# Patient Record
Sex: MALE | Race: BLACK OR AFRICAN AMERICAN | NOT HISPANIC OR LATINO | URBAN - METROPOLITAN AREA
[De-identification: names, ages, dates, MRNs, and addresses within clinical notes are randomized per-mention and may not be internally consistent; named-entity substitution may affect disease eponyms.]

---

## 2022-10-12 PROBLEM — Z00.00 ENCOUNTER FOR PREVENTIVE HEALTH EXAMINATION: Status: ACTIVE | Noted: 2022-10-12

## 2022-10-13 ENCOUNTER — OUTPATIENT (OUTPATIENT)
Dept: OUTPATIENT SERVICES | Facility: HOSPITAL | Age: 62
LOS: 1 days | End: 2022-10-13
Payer: MEDICARE

## 2022-10-13 ENCOUNTER — RESULT REVIEW (OUTPATIENT)
Age: 62
End: 2022-10-13

## 2022-10-13 ENCOUNTER — APPOINTMENT (OUTPATIENT)
Dept: ORTHOPEDIC SURGERY | Facility: CLINIC | Age: 62
End: 2022-10-13

## 2022-10-13 PROCEDURE — 73521 X-RAY EXAM HIPS BI 2 VIEWS: CPT

## 2022-10-13 PROCEDURE — 73521 X-RAY EXAM HIPS BI 2 VIEWS: CPT | Mod: 26

## 2022-10-13 PROCEDURE — 99204 OFFICE O/P NEW MOD 45 MIN: CPT

## 2022-10-13 PROCEDURE — 73562 X-RAY EXAM OF KNEE 3: CPT | Mod: 26,50

## 2022-10-13 PROCEDURE — 73564 X-RAY EXAM KNEE 4 OR MORE: CPT | Mod: RT

## 2022-10-13 PROCEDURE — 73562 X-RAY EXAM OF KNEE 3: CPT

## 2022-10-13 NOTE — REASON FOR VISIT
[Follow-Up Visit] : a follow-up visit for [Hip Pain] : hip pain [Knee Pain] : knee pain [FreeTextEntry2] : Hip and Knee pain.

## 2022-10-13 NOTE — HISTORY OF PRESENT ILLNESS
[de-identified] : First-time visit for this retired 62-year-old gentleman who is here with a complaint of bilateral knee pain and stiffness bilateral hip pain and stiffness.  Patient is status post 4 years right knee replacement by another physician.  Patient had an uncomplicated postop course with the exception of lacking of flexion.  Patient states he has difficulty getting up and out of chairs and getting out of automobile and stairs because of the lack of terminal flexion.  He is here to discuss what he done about his right knee as well as his right hip pain.  Pain in the right hip is fairly chronic is no significant significant decreased range of motion of the right hip he does not recall a specific accident injury or any recent traumatic initiating event he did play basketball as many years as a young male.

## 2022-10-13 NOTE — PHYSICAL EXAM
[de-identified] : Right knee has a well-healed anterior incision range of motion is 3 to 90 degrees.  There is no warmth about the knee.  Knee stable to AP stress varus valgus stress of both 90 degrees and near full extension.\par \par Right hip patient has minimal internal and external rotation at 90 degrees any attempt at rotation causes discomfort. [de-identified] : Hip x-rays were ordered today.  AP and lateral both hips were obtained showing severe osteoarthritis of the right hip with complete cartilage loss and secondary findings of osteophyte and cyst formation.\par \par Knee x-rays were ordered today.  AP standing individual and sunrise views were obtained showing a well-positioned right hip knee implant with no evidence of loosening migration wear or other abnormalities.

## 2022-10-13 NOTE — ASSESSMENT
[FreeTextEntry1] : Patient is status post 4 years right knee replacement with difficulty in flexion beyond 90 degrees.  Patient also has severe right hip osteoarthritis.

## 2022-10-13 NOTE — DISCUSSION/SUMMARY
[Surgical risks reviewed] : Surgical risks reviewed [de-identified] : Had a long discussion with the patient today about the underlying etiology of his right stiffness.  We talked at length about how stiffness can occur despite excellent component position and soft tissue balancing.  I mentioned that this happens to my own patients and that typically at both the first and second postoperative visit range of motion was assessed and fairly frequently required we will recommend knee manipulation.  Patient is now 4 years out of his operation and despite the fact that the best results of the manipulation typically occur within the first 6 weeks to 3 months of the operation there is literature to suggest that remote manipulation from the time of surgery may also yield significant benefits.  We also talked at length about how if he does not gain significant range of motion an open synovectomy and revision may be considered.  Patient will consider these options.\par \par Patient and I also had a long discussion about the underlying etiology of his right hip pain and stiffness.  Patient has diagnosis of severe osteoarthritis of the right hip.  We talked about our options would be best served to consider hip replacement surgery the reasonable risk benefits were discussed.  Patient will also consider this option in the future.\par \par Plan.  Patient will consider the option of right knee manipulation under anesthesia with the understanding that is of the remote nature between the index operation manipulation we cannot guarantee any significant improvement.  We also talked about the potential risks such as femoral fracture.  As far as the hip is concerned patient was advised to consider hip replacement surgery will consider this option follow-up with us as needed for both issues.

## 2023-01-07 ENCOUNTER — LABORATORY RESULT (OUTPATIENT)
Age: 63
End: 2023-01-07

## 2023-01-10 ENCOUNTER — TRANSCRIPTION ENCOUNTER (OUTPATIENT)
Age: 63
End: 2023-01-10

## 2023-01-10 VITALS
WEIGHT: 241.19 LBS | RESPIRATION RATE: 16 BRPM | DIASTOLIC BLOOD PRESSURE: 68 MMHG | OXYGEN SATURATION: 98 % | SYSTOLIC BLOOD PRESSURE: 148 MMHG | HEART RATE: 77 BPM | TEMPERATURE: 98 F | HEIGHT: 76 IN

## 2023-01-10 NOTE — ASU PATIENT PROFILE, ADULT - NSICDXPASTSURGICALHX_GEN_ALL_CORE_FT
PAST SURGICAL HISTORY:  H/O total knee replacement, right 2018    History of neuroma x2 surgeries to left foot

## 2023-01-10 NOTE — ASU PATIENT PROFILE, ADULT - NSICDXPASTMEDICALHX_GEN_ALL_CORE_FT
PAST MEDICAL HISTORY:  2019 novel coronavirus disease (COVID-19)     HTN (hypertension)      PAST MEDICAL HISTORY:  2019 novel coronavirus disease (COVID-19)     Arthritis     HTN (hypertension)     Seasonal allergic rhinitis

## 2023-01-11 ENCOUNTER — APPOINTMENT (OUTPATIENT)
Dept: ORTHOPEDIC SURGERY | Facility: HOSPITAL | Age: 63
End: 2023-01-11
Payer: MEDICARE

## 2023-01-11 ENCOUNTER — TRANSCRIPTION ENCOUNTER (OUTPATIENT)
Age: 63
End: 2023-01-11

## 2023-01-11 ENCOUNTER — OUTPATIENT (OUTPATIENT)
Dept: OUTPATIENT SERVICES | Facility: HOSPITAL | Age: 63
LOS: 1 days | Discharge: HOME | End: 2023-01-11
Payer: COMMERCIAL

## 2023-01-11 VITALS — OXYGEN SATURATION: 98 % | HEART RATE: 65 BPM | RESPIRATION RATE: 18 BRPM

## 2023-01-11 DIAGNOSIS — Z96.651 PRESENCE OF RIGHT ARTIFICIAL KNEE JOINT: Chronic | ICD-10-CM

## 2023-01-11 DIAGNOSIS — Z86.018 PERSONAL HISTORY OF OTHER BENIGN NEOPLASM: Chronic | ICD-10-CM

## 2023-01-11 PROCEDURE — 27570 FIXATION OF KNEE JOINT: CPT | Mod: RT

## 2023-01-11 RX ORDER — ACETAMINOPHEN WITH CODEINE 300MG-30MG
1 TABLET ORAL
Qty: 10 | Refills: 0
Start: 2023-01-11

## 2023-01-11 RX ORDER — OXYCODONE AND ACETAMINOPHEN 5; 325 MG/1; MG/1
2 TABLET ORAL ONCE
Refills: 0 | Status: DISCONTINUED | OUTPATIENT
Start: 2023-01-11 | End: 2023-01-11

## 2023-01-11 RX ORDER — OXYCODONE AND ACETAMINOPHEN 5; 325 MG/1; MG/1
1 TABLET ORAL ONCE
Refills: 0 | Status: DISCONTINUED | OUTPATIENT
Start: 2023-01-11 | End: 2023-01-11

## 2023-01-11 RX ORDER — SODIUM CHLORIDE 9 MG/ML
1000 INJECTION, SOLUTION INTRAVENOUS
Refills: 0 | Status: DISCONTINUED | OUTPATIENT
Start: 2023-01-11 | End: 2023-01-11

## 2023-01-11 RX ORDER — MORPHINE SULFATE 50 MG/1
2 CAPSULE, EXTENDED RELEASE ORAL
Refills: 0 | Status: DISCONTINUED | OUTPATIENT
Start: 2023-01-11 | End: 2023-01-11

## 2023-01-11 RX ORDER — ONDANSETRON 8 MG/1
4 TABLET, FILM COATED ORAL ONCE
Refills: 0 | Status: DISCONTINUED | OUTPATIENT
Start: 2023-01-11 | End: 2023-01-11

## 2023-01-11 NOTE — BRIEF OPERATIVE NOTE - NSICDXBRIEFPREOP_GEN_ALL_CORE_FT
PRE-OP DIAGNOSIS:  Arthrofibrosis of total knee arthroplasty 11-Jan-2023 09:04:45  Joseph Jauregui

## 2023-01-11 NOTE — ASU DISCHARGE PLAN (ADULT/PEDIATRIC) - CARE PROVIDER_API CALL
Sree Navarro)  Orthopaedic Surgery  [address] [facility], 10th [place] [address]  Phone: [phone]  Fax: [phone]  Established Patient  Follow Up Time:

## 2023-01-11 NOTE — BRIEF OPERATIVE NOTE - NSICDXBRIEFPOSTOP_GEN_ALL_CORE_FT
POST-OP DIAGNOSIS:  Arthrofibrosis of total knee arthroplasty 11-Jan-2023 09:04:51  Joseph Jauregui

## 2023-01-11 NOTE — BRIEF OPERATIVE NOTE - NSICDXBRIEFPROCEDURE_GEN_ALL_CORE_FT
PROCEDURES:  Manipulation of right knee 11-Jan-2023 09:04:06  Joseph Jauregui  Injection of right knee 11-Jan-2023 09:04:19  Joseph Jauregui

## 2023-01-12 PROBLEM — I10 ESSENTIAL (PRIMARY) HYPERTENSION: Chronic | Status: ACTIVE | Noted: 2023-01-10

## 2023-01-12 PROBLEM — U07.1 COVID-19: Chronic | Status: ACTIVE | Noted: 2023-01-10

## 2023-01-17 ENCOUNTER — APPOINTMENT (OUTPATIENT)
Dept: ORTHOPEDIC SURGERY | Facility: CLINIC | Age: 63
End: 2023-01-17
Payer: MEDICARE

## 2023-01-17 DIAGNOSIS — M17.11 UNILATERAL PRIMARY OSTEOARTHRITIS, RIGHT KNEE: ICD-10-CM

## 2023-01-17 PROCEDURE — 99214 OFFICE O/P EST MOD 30 MIN: CPT

## 2023-01-17 NOTE — HISTORY OF PRESENT ILLNESS
[de-identified] : Patient returns today he is status postop day 6 right knee manipulation under anesthesia.  Patient states he feels significant improvement is having a much easier time going up and down steps is very pleased with the result.  He continues to do his own exercise and stretching regimen.

## 2023-01-17 NOTE — PHYSICAL EXAM
[de-identified] : Right knee physical exam 0 to 90 degrees.  There is no significant warmth no swelling quad tone is good extensor mechanism is intact.

## 2023-01-17 NOTE — DISCUSSION/SUMMARY
[Surgical risks reviewed] : Surgical risks reviewed [de-identified] : Patient will continue with his exercise regimen on his own.  He will follow-up with me in 2 months time we we will consider repeat manipulation if patient would like to improve the bending further but for now he is quite satisfied with his result.\par \par We also talked at length about how the patient would want to consider hip replacement surgery sometime in February previous visits and radiographs indicate severe osteoarthritis of the right hip.  The reasonable risk and benefits of the procedure were discussed in detail including potential complications.\par \par Surgical risks reviewed. We talked about potential complications of surgery.  We talked about potential complications which may require revision surgery such as component migration wear, loosening, infection, instability and leg length equality.\par We also talked at length about typical convalescence expectations, typical expectations for implant longevity, surgical approach for us which is posterior as well as implant selection criteria.\par \par Plan patient will be scheduled for hip replacement surgery sometime in February.\par \par Today's consultation lasted approximately 35 minutes.

## 2023-01-17 NOTE — REASON FOR VISIT
[Follow-Up Visit] : a follow-up visit for [Artificial Knee Joint] : an artificial knee joint [FreeTextEntry2] : Rt knee manipulation 1/11. Pt states he is feeling good.

## 2023-02-09 PROBLEM — M19.90 UNSPECIFIED OSTEOARTHRITIS, UNSPECIFIED SITE: Chronic | Status: ACTIVE | Noted: 2023-01-11

## 2023-02-09 PROBLEM — J30.2 OTHER SEASONAL ALLERGIC RHINITIS: Chronic | Status: ACTIVE | Noted: 2023-01-11

## 2023-03-04 ENCOUNTER — LABORATORY RESULT (OUTPATIENT)
Age: 63
End: 2023-03-04

## 2023-03-07 ENCOUNTER — TRANSCRIPTION ENCOUNTER (OUTPATIENT)
Age: 63
End: 2023-03-07

## 2023-03-07 VITALS
HEART RATE: 74 BPM | SYSTOLIC BLOOD PRESSURE: 133 MMHG | TEMPERATURE: 98 F | RESPIRATION RATE: 16 BRPM | DIASTOLIC BLOOD PRESSURE: 73 MMHG | WEIGHT: 218.48 LBS | OXYGEN SATURATION: 97 % | HEIGHT: 76 IN

## 2023-03-07 RX ORDER — POVIDONE-IODINE 5 %
1 AEROSOL (ML) TOPICAL ONCE
Refills: 0 | Status: COMPLETED | OUTPATIENT
Start: 2023-03-08 | End: 2023-03-08

## 2023-03-07 NOTE — ASU PATIENT PROFILE, ADULT - FALL HARM RISK - UNIVERSAL INTERVENTIONS
Bed in lowest position, wheels locked, appropriate side rails in place/Call bell, personal items and telephone in reach/Instruct patient to call for assistance before getting out of bed or chair/Non-slip footwear when patient is out of bed/[place] to call system/Physically safe environment - no spills, clutter or unnecessary equipment/Purposeful Proactive Rounding/Room/bathroom lighting operational, light cord in reach

## 2023-03-07 NOTE — H&P ADULT - NSICDXPASTMEDICALHX_GEN_ALL_CORE_FT
PAST MEDICAL HISTORY:  2019 novel coronavirus disease (COVID-19)     Arthritis     HTN (hypertension)     Osteoarthritis of right hip     Seasonal allergic rhinitis      PAST MEDICAL HISTORY:  2019 novel coronavirus disease (COVID-19)     Arthritis     HTN (hypertension)     Neuroma     Osteoarthritis of right hip     Seasonal allergic rhinitis

## 2023-03-07 NOTE — ASU PATIENT PROFILE, ADULT - NSICDXPASTMEDICALHX_GEN_ALL_CORE_FT
PAST MEDICAL HISTORY:  2019 novel coronavirus disease (COVID-19)     Arthritis     HTN (hypertension)     Neuroma     Osteoarthritis of right hip     Seasonal allergic rhinitis

## 2023-03-07 NOTE — H&P ADULT - HISTORY OF PRESENT ILLNESS
62M c/o right hip pain x       Present for elective right total hip replacement  62M c/o right hip pain x 1 year without inciting accident or injury. States pain is worst with walking. Denies numbness/tingling to bilateral lower extremities. Pain persists despite conservative measures. Ambulates without assistance.   Denies history of a blood clot or seizures. Denies CP/SOB/N/V.     Present for elective right total hip replacement

## 2023-03-07 NOTE — H&P ADULT - NSHPPHYSICALEXAM_GEN_ALL_CORE
MSK: + decreased ROM 2/2 pain, right hip      Remainder of exam per medical clearance note Gen: Alert and oriented, NAD  MSK: + decreased ROM 2/2 pain, right hip  No evidence of deformity or open wounds.   DP pulses palpable bilaterally.   SILT to bilateral lower extremities  EHL/FHL/TA/GS 5/5 bilaterally    Remainder of exam per medical clearance note

## 2023-03-07 NOTE — H&P ADULT - NSHPLABSRESULTS_GEN_ALL_CORE
3M DOS  Covid negative 3/4 3M DOS  Covid negative 3/4  Preop CBC, BMP, PT/PTT/INR, UA within normal limits- reviewed by medical clearance.  Preop EKG: NSR, reviewed by medical clearance.

## 2023-03-07 NOTE — H&P ADULT - PROBLEM SELECTOR PLAN 1
Admit to Orthopaedic Service.  Presents today for elective right THR   Pt medically stable and cleared for procedure today by  Admit to Orthopaedic Service.  Presents today for elective right THR   Pt medically stable and cleared for procedure today by Dr. Reese

## 2023-03-08 ENCOUNTER — INPATIENT (INPATIENT)
Facility: HOSPITAL | Age: 63
LOS: 1 days | Discharge: HOME | DRG: 470 | End: 2023-03-10
Attending: SPECIALIST | Admitting: SPECIALIST
Payer: COMMERCIAL

## 2023-03-08 ENCOUNTER — RESULT REVIEW (OUTPATIENT)
Age: 63
End: 2023-03-08

## 2023-03-08 ENCOUNTER — APPOINTMENT (OUTPATIENT)
Dept: ORTHOPEDIC SURGERY | Facility: HOSPITAL | Age: 63
End: 2023-03-08
Payer: MEDICARE

## 2023-03-08 DIAGNOSIS — M16.11 UNILATERAL PRIMARY OSTEOARTHRITIS, RIGHT HIP: ICD-10-CM

## 2023-03-08 DIAGNOSIS — I10 ESSENTIAL (PRIMARY) HYPERTENSION: ICD-10-CM

## 2023-03-08 DIAGNOSIS — Z86.018 PERSONAL HISTORY OF OTHER BENIGN NEOPLASM: Chronic | ICD-10-CM

## 2023-03-08 DIAGNOSIS — Z96.651 PRESENCE OF RIGHT ARTIFICIAL KNEE JOINT: Chronic | ICD-10-CM

## 2023-03-08 PROCEDURE — 72170 X-RAY EXAM OF PELVIS: CPT | Mod: 26

## 2023-03-08 PROCEDURE — 27130 TOTAL HIP ARTHROPLASTY: CPT | Mod: RT

## 2023-03-08 PROCEDURE — 27130 TOTAL HIP ARTHROPLASTY: CPT | Mod: AS,RT

## 2023-03-08 DEVICE — HEAD FEM CEM TAPR PLUS 1.5MM 12/14X36MM: Type: IMPLANTABLE DEVICE | Site: RIGHT | Status: FUNCTIONAL

## 2023-03-08 DEVICE — PIN TROCAR STEIN 1 END 4X22.9MM: Type: IMPLANTABLE DEVICE | Site: RIGHT | Status: FUNCTIONAL

## 2023-03-08 DEVICE — PINNACLE ALTRX 36  X 58: Type: IMPLANTABLE DEVICE | Site: RIGHT | Status: FUNCTIONAL

## 2023-03-08 DEVICE — CUP ACETABULAR 58MM: Type: IMPLANTABLE DEVICE | Site: RIGHT | Status: FUNCTIONAL

## 2023-03-08 DEVICE — IMPLANTABLE DEVICE: Type: IMPLANTABLE DEVICE | Site: RIGHT | Status: FUNCTIONAL

## 2023-03-08 RX ORDER — MAGNESIUM HYDROXIDE 400 MG/1
30 TABLET, CHEWABLE ORAL DAILY
Refills: 0 | Status: DISCONTINUED | OUTPATIENT
Start: 2023-03-08 | End: 2023-03-10

## 2023-03-08 RX ORDER — ACETAMINOPHEN 500 MG
975 TABLET ORAL EVERY 8 HOURS
Refills: 0 | Status: DISCONTINUED | OUTPATIENT
Start: 2023-03-08 | End: 2023-03-10

## 2023-03-08 RX ORDER — CELECOXIB 200 MG/1
400 CAPSULE ORAL ONCE
Refills: 0 | Status: COMPLETED | OUTPATIENT
Start: 2023-03-08 | End: 2023-03-08

## 2023-03-08 RX ORDER — CELECOXIB 200 MG/1
200 CAPSULE ORAL EVERY 12 HOURS
Refills: 0 | Status: DISCONTINUED | OUTPATIENT
Start: 2023-03-09 | End: 2023-03-09

## 2023-03-08 RX ORDER — CELECOXIB 200 MG/1
200 CAPSULE ORAL DAILY
Refills: 0 | Status: DISCONTINUED | OUTPATIENT
Start: 2023-03-08 | End: 2023-03-10

## 2023-03-08 RX ORDER — OXYCODONE HYDROCHLORIDE 5 MG/1
10 TABLET ORAL
Refills: 0 | Status: DISCONTINUED | OUTPATIENT
Start: 2023-03-08 | End: 2023-03-10

## 2023-03-08 RX ORDER — HYDROMORPHONE HYDROCHLORIDE 2 MG/ML
0.5 INJECTION INTRAMUSCULAR; INTRAVENOUS; SUBCUTANEOUS
Refills: 0 | Status: DISCONTINUED | OUTPATIENT
Start: 2023-03-08 | End: 2023-03-10

## 2023-03-08 RX ORDER — OXYCODONE HYDROCHLORIDE 5 MG/1
20 TABLET ORAL ONCE
Refills: 0 | Status: DISCONTINUED | OUTPATIENT
Start: 2023-03-08 | End: 2023-03-08

## 2023-03-08 RX ORDER — SODIUM CHLORIDE 9 MG/ML
1000 INJECTION, SOLUTION INTRAVENOUS
Refills: 0 | Status: DISCONTINUED | OUTPATIENT
Start: 2023-03-08 | End: 2023-03-10

## 2023-03-08 RX ORDER — SENNA PLUS 8.6 MG/1
2 TABLET ORAL AT BEDTIME
Refills: 0 | Status: DISCONTINUED | OUTPATIENT
Start: 2023-03-08 | End: 2023-03-10

## 2023-03-08 RX ORDER — CHLORHEXIDINE GLUCONATE 213 G/1000ML
1 SOLUTION TOPICAL EVERY 12 HOURS
Refills: 0 | Status: DISCONTINUED | OUTPATIENT
Start: 2023-03-08 | End: 2023-03-08

## 2023-03-08 RX ORDER — HYDROMORPHONE HYDROCHLORIDE 2 MG/ML
0.5 INJECTION INTRAMUSCULAR; INTRAVENOUS; SUBCUTANEOUS EVERY 4 HOURS
Refills: 0 | Status: DISCONTINUED | OUTPATIENT
Start: 2023-03-08 | End: 2023-03-10

## 2023-03-08 RX ORDER — ONDANSETRON 8 MG/1
4 TABLET, FILM COATED ORAL EVERY 6 HOURS
Refills: 0 | Status: DISCONTINUED | OUTPATIENT
Start: 2023-03-08 | End: 2023-03-10

## 2023-03-08 RX ORDER — POLYETHYLENE GLYCOL 3350 17 G/17G
17 POWDER, FOR SOLUTION ORAL AT BEDTIME
Refills: 0 | Status: DISCONTINUED | OUTPATIENT
Start: 2023-03-08 | End: 2023-03-10

## 2023-03-08 RX ORDER — OXYCODONE HYDROCHLORIDE 5 MG/1
5 TABLET ORAL
Refills: 0 | Status: DISCONTINUED | OUTPATIENT
Start: 2023-03-08 | End: 2023-03-10

## 2023-03-08 RX ORDER — ASPIRIN/CALCIUM CARB/MAGNESIUM 324 MG
325 TABLET ORAL
Refills: 0 | Status: DISCONTINUED | OUTPATIENT
Start: 2023-03-09 | End: 2023-03-10

## 2023-03-08 RX ORDER — CEFAZOLIN SODIUM 1 G
2000 VIAL (EA) INJECTION EVERY 8 HOURS
Refills: 0 | Status: COMPLETED | OUTPATIENT
Start: 2023-03-08 | End: 2023-03-09

## 2023-03-08 RX ADMIN — Medication 100 MILLIGRAM(S): at 21:32

## 2023-03-08 RX ADMIN — OXYCODONE HYDROCHLORIDE 20 MILLIGRAM(S): 5 TABLET ORAL at 10:23

## 2023-03-08 RX ADMIN — Medication 1 APPLICATION(S): at 08:22

## 2023-03-08 RX ADMIN — CELECOXIB 400 MILLIGRAM(S): 200 CAPSULE ORAL at 10:23

## 2023-03-08 RX ADMIN — CELECOXIB 400 MILLIGRAM(S): 200 CAPSULE ORAL at 10:14

## 2023-03-08 RX ADMIN — Medication 975 MILLIGRAM(S): at 22:32

## 2023-03-08 RX ADMIN — CHLORHEXIDINE GLUCONATE 1 APPLICATION(S): 213 SOLUTION TOPICAL at 07:49

## 2023-03-08 RX ADMIN — Medication 975 MILLIGRAM(S): at 21:32

## 2023-03-08 RX ADMIN — OXYCODONE HYDROCHLORIDE 20 MILLIGRAM(S): 5 TABLET ORAL at 10:14

## 2023-03-08 NOTE — PHYSICAL THERAPY INITIAL EVALUATION ADULT - ADDITIONAL COMMENTS
Patient lives alone, however will be stating with his mother on 5th floor walk up apartment. Does not use any Assistive Devices at baseline. Owns SC, RW and raised toilet seat. Denies recent Hx of falls.

## 2023-03-08 NOTE — PHYSICAL THERAPY INITIAL EVALUATION ADULT - GENERAL OBSERVATIONS, REHAB EVAL
Patient received semi-mattson in bed  in NAD on RA, +SCDs, +PIV, +Abduction pillow. Cleared by JONO Mcgraw. Agreeable to PT.

## 2023-03-08 NOTE — PROGRESS NOTE ADULT - SUBJECTIVE AND OBJECTIVE BOX
Orthopedics Post Op Check    Procedure: right REBKEAH   Surgeon: Dr Navarro     Pt comfortable, without complaints. Pain well controlled in pacu   Denies CP, SOB, N/V, numbness/tingling     Vital Signs Last 24 Hrs  T(C): 36.2 (08 Mar 2023 14:22), Max: 36.7 (08 Mar 2023 10:21)  T(F): 97.1 (08 Mar 2023 14:22), Max: 97.1 (08 Mar 2023 14:22)  HR: 58 (08 Mar 2023 14:43) (54 - 74)  BP: 128/79 (08 Mar 2023 14:38) (114/75 - 133/73)  BP(mean): 99 (08 Mar 2023 14:38) (91 - 99)  RR: 18 (08 Mar 2023 14:43) (10 - 19)  SpO2: 100% (08 Mar 2023 14:38) (96% - 100%)    Parameters below as of 08 Mar 2023 14:43  Patient On (Oxygen Delivery Method): nasal cannula  O2 Flow (L/min): 2    AVSS, NAD  General: Pt Alert and oriented, comfortable  Dressing C/D/I, aquacel, abduction pillow in place   Sensation intact and equal BLE   Motor ehl/ta/gs/fhl 5/5 BLE   Pulses dp palpable BLE, skin warm and well perfused, cap refill brisk     Post op XR: s/p  right REBEKAH hardware in place     A/P: 62yMale POD#0 s/p right REBEKAH doing well in PACU   - Stable  - Pain Control  - DVT ppx: SCDs ASA   - Melissa-op abx: Ancef  - PT, WBS: WBAT   - F/U AM Labs

## 2023-03-08 NOTE — PHYSICAL THERAPY INITIAL EVALUATION ADULT - PERTINENT HX OF CURRENT PROBLEM, REHAB EVAL
62M c/o right hip pain x 1 year without inciting accident or injury. States pain is worst with walking. Denies numbness/tingling to bilateral lower extremities. Pain persists despite conservative measures. Ambulates without assistance.

## 2023-03-09 ENCOUNTER — TRANSCRIPTION ENCOUNTER (OUTPATIENT)
Age: 63
End: 2023-03-09

## 2023-03-09 LAB
ANION GAP SERPL CALC-SCNC: 8 MMOL/L — SIGNIFICANT CHANGE UP (ref 5–17)
BUN SERPL-MCNC: 15 MG/DL — SIGNIFICANT CHANGE UP (ref 7–23)
CALCIUM SERPL-MCNC: 8.6 MG/DL — SIGNIFICANT CHANGE UP (ref 8.4–10.5)
CHLORIDE SERPL-SCNC: 105 MMOL/L — SIGNIFICANT CHANGE UP (ref 96–108)
CO2 SERPL-SCNC: 28 MMOL/L — SIGNIFICANT CHANGE UP (ref 22–31)
CREAT SERPL-MCNC: 1.08 MG/DL — SIGNIFICANT CHANGE UP (ref 0.5–1.3)
EGFR: 79 ML/MIN/1.73M2 — SIGNIFICANT CHANGE UP
GLUCOSE SERPL-MCNC: 118 MG/DL — HIGH (ref 70–99)
HCT VFR BLD CALC: 42.9 % — SIGNIFICANT CHANGE UP (ref 39–50)
HCV AB S/CO SERPL IA: 0.04 S/CO — SIGNIFICANT CHANGE UP
HCV AB SERPL-IMP: SIGNIFICANT CHANGE UP
HGB BLD-MCNC: 13.8 G/DL — SIGNIFICANT CHANGE UP (ref 13–17)
MCHC RBC-ENTMCNC: 28.5 PG — SIGNIFICANT CHANGE UP (ref 27–34)
MCHC RBC-ENTMCNC: 32.2 GM/DL — SIGNIFICANT CHANGE UP (ref 32–36)
MCV RBC AUTO: 88.6 FL — SIGNIFICANT CHANGE UP (ref 80–100)
NRBC # BLD: 0 /100 WBCS — SIGNIFICANT CHANGE UP (ref 0–0)
PLATELET # BLD AUTO: 202 K/UL — SIGNIFICANT CHANGE UP (ref 150–400)
POTASSIUM SERPL-MCNC: 4.1 MMOL/L — SIGNIFICANT CHANGE UP (ref 3.5–5.3)
POTASSIUM SERPL-SCNC: 4.1 MMOL/L — SIGNIFICANT CHANGE UP (ref 3.5–5.3)
RBC # BLD: 4.84 M/UL — SIGNIFICANT CHANGE UP (ref 4.2–5.8)
RBC # FLD: 14 % — SIGNIFICANT CHANGE UP (ref 10.3–14.5)
SODIUM SERPL-SCNC: 141 MMOL/L — SIGNIFICANT CHANGE UP (ref 135–145)
WBC # BLD: 7.32 K/UL — SIGNIFICANT CHANGE UP (ref 3.8–10.5)
WBC # FLD AUTO: 7.32 K/UL — SIGNIFICANT CHANGE UP (ref 3.8–10.5)

## 2023-03-09 RX ORDER — NIFEDIPINE 30 MG
90 TABLET, EXTENDED RELEASE 24 HR ORAL DAILY
Refills: 0 | Status: DISCONTINUED | OUTPATIENT
Start: 2023-03-09 | End: 2023-03-10

## 2023-03-09 RX ADMIN — OXYCODONE HYDROCHLORIDE 5 MILLIGRAM(S): 5 TABLET ORAL at 10:13

## 2023-03-09 RX ADMIN — Medication 975 MILLIGRAM(S): at 14:05

## 2023-03-09 RX ADMIN — CELECOXIB 200 MILLIGRAM(S): 200 CAPSULE ORAL at 11:16

## 2023-03-09 RX ADMIN — Medication 975 MILLIGRAM(S): at 05:28

## 2023-03-09 RX ADMIN — OXYCODONE HYDROCHLORIDE 5 MILLIGRAM(S): 5 TABLET ORAL at 16:47

## 2023-03-09 RX ADMIN — OXYCODONE HYDROCHLORIDE 5 MILLIGRAM(S): 5 TABLET ORAL at 11:13

## 2023-03-09 RX ADMIN — Medication 325 MILLIGRAM(S): at 05:28

## 2023-03-09 RX ADMIN — OXYCODONE HYDROCHLORIDE 5 MILLIGRAM(S): 5 TABLET ORAL at 15:47

## 2023-03-09 RX ADMIN — Medication 100 MILLIGRAM(S): at 05:28

## 2023-03-09 RX ADMIN — Medication 90 MILLIGRAM(S): at 11:16

## 2023-03-09 RX ADMIN — Medication 975 MILLIGRAM(S): at 21:07

## 2023-03-09 RX ADMIN — Medication 325 MILLIGRAM(S): at 18:36

## 2023-03-09 RX ADMIN — Medication 975 MILLIGRAM(S): at 22:07

## 2023-03-09 RX ADMIN — CELECOXIB 200 MILLIGRAM(S): 200 CAPSULE ORAL at 12:16

## 2023-03-09 RX ADMIN — Medication 975 MILLIGRAM(S): at 15:05

## 2023-03-09 RX ADMIN — Medication 975 MILLIGRAM(S): at 06:28

## 2023-03-09 RX ADMIN — Medication 1 TABLET(S): at 11:16

## 2023-03-09 NOTE — PROGRESS NOTE ADULT - SUBJECTIVE AND OBJECTIVE BOX
Ortho Note    Subjective:  Pt comfortable without complaints, pain controlled with current pain medication regimen   Denies CP, SOB, N/V, numbness/tingling   Reviewed plan of care with patient at bedside, reviewed posterior hip precautions       Vital Signs Last 24 Hrs  T(C): 36.7 (03-09-23 @ 08:18), Max: 36.8 (03-09-23 @ 05:30)  T(F): 98.1 (03-09-23 @ 08:18), Max: 98.3 (03-09-23 @ 05:30)  HR: 64 (03-09-23 @ 08:18) (54 - 64)  BP: 129/69 (03-09-23 @ 08:18) (129/69 - 143/76)  BP(mean): --  RR: 16 (03-09-23 @ 08:18) (16 - 18)  SpO2: 95% (03-09-23 @ 08:18) (95% - 98%)  AVSS    Objective:    Physical Exam:  General: Pt Alert and oriented, NAD  Right hip aquacel DSG C/D/I, abductor pillow in place   Pulses: +2 pedal pulses wwp toes  Sensation: silt intact bilateral lower extremities  Motor: EHL/FHL/TA/GS- 5/5 bilateral lower extremities        Plan of Care:  A/P: 62yMale POD#1 s/p Right Posterior REBEKAH  - afebrile  - Pain Control- tylenol 975mg PO Q8h, celebrex 200mg PO Daily, Dilaudid 0.5mg Q4h prn breakthrough pain, Oxycodone 5-10mg PO Q3h prn moderate to severe pain   - DVT ppx: aspirin 325mg PO BID   - PT, WBS: WBAT  - ambulate with PT as tolerated  - Dispo- home pending pt clearance     Ortho Pager [phone] Ortho Note    Subjective:  Pt comfortable without complaints, pain controlled with current pain medication regimen   Denies CP, SOB, N/V, numbness/tingling   Reviewed plan of care with patient at bedside, reviewed posterior hip precautions       Vital Signs Last 24 Hrs  T(C): 36.7 (03-09-23 @ 08:18), Max: 36.8 (03-09-23 @ 05:30)  T(F): 98.1 (03-09-23 @ 08:18), Max: 98.3 (03-09-23 @ 05:30)  HR: 64 (03-09-23 @ 08:18) (54 - 64)  BP: 129/69 (03-09-23 @ 08:18) (129/69 - 143/76)  BP(mean): --  RR: 16 (03-09-23 @ 08:18) (16 - 18)  SpO2: 95% (03-09-23 @ 08:18) (95% - 98%)  AVSS    Objective:    Physical Exam:  General: Pt Alert and oriented, NAD  Right hip aquacel DSG C/D/I, abductor pillow in place   Pulses: +2 pedal pulses wwp toes  Sensation: silt intact bilateral lower extremities  Motor: EHL/FHL/TA/GS- 5/5 bilateral lower extremities        Plan of Care:  A/P: 62yMale POD#1 s/p Right Posterior REBEKAH  - afebrile  - Pain Control- tylenol 975mg PO Q8h, celebrex 200mg PO Daily, Dilaudid 0.5mg Q4h prn breakthrough pain, Oxycodone 5-10mg PO Q3h prn moderate to severe pain   - DVT ppx: aspirin 325mg PO BID   - PT, WBS: WBAT  - ambulate with PT as tolerated  - bowel regimen, IS use, PPI   - Dispo- home pending pt clearance     Ortho Pager [phone]

## 2023-03-09 NOTE — PATIENT PROFILE ADULT - NSPROHMSYMPCOND_GEN_A_NUR
Allergist - Dr. Julio Mayers at [address] [facility]    Pediatric Dermatologist - Dr Alejandra Johnson at [address] [facility]
none

## 2023-03-09 NOTE — DISCHARGE NOTE PROVIDER - NSDCCPCAREPLAN_GEN_ALL_CORE_FT
PRINCIPAL DISCHARGE DIAGNOSIS  Diagnosis: Osteoarthritis of right hip  Assessment and Plan of Treatment: s/p posterior Right hip replacement

## 2023-03-09 NOTE — PROGRESS NOTE ADULT - SUBJECTIVE AND OBJECTIVE BOX
Orthopedics Progress Note    Procedure: right REBEKAH   Surgeon: Dr Navarro     Pt comfortable, without complaints. Pain very well controlled.   Denies CP, SOB, N/V, numbness/tingling     Vital Signs Last 24 Hrs  T(C): 36.8 (09 Mar 2023 05:30), Max: 36.8 (08 Mar 2023 18:04)  T(F): 98.3 (09 Mar 2023 05:30), Max: 98.3 (08 Mar 2023 18:04)  HR: 54 (09 Mar 2023 05:30) (46 - 85)  BP: 143/76 (09 Mar 2023 05:30) (114/71 - 159/80)  BP(mean): 98 (08 Mar 2023 17:22) (91 - 103)  RR: 18 (09 Mar 2023 05:30) (10 - 21)  SpO2: 98% (09 Mar 2023 05:30) (96% - 100%)    AVSS, NAD  General: Pt Alert and oriented, comfortable  Dressing C/D/I, aquacel, abduction pillow in place   Sensation intact and equal BLE   Motor ehl/ta/gs/fhl 5/5 BLE   Pulses dp palpable BLE, skin warm and well perfused, cap refill brisk     Post op XR: s/p  right REBEKAH hardware in place     A/P: 62yMale s/p R Posterior REBEKAH on 3/8  - Stable  - Pain Control  - DVT ppx: SCDs  BID  - Melissa-op abx: Ancef  - PT, WBS: WBAT   - F/U AM Labs

## 2023-03-09 NOTE — OCCUPATIONAL THERAPY INITIAL EVALUATION ADULT - RANGE OF MOTION EXAMINATION, LOWER EXTREMITY
Except R hip flexion 2/2 posterior hip precautions/bilateral LE Active ROM was WFL  (within functional limits)

## 2023-03-09 NOTE — DISCHARGE NOTE PROVIDER - NSDCMRMEDTOKEN_GEN_ALL_CORE_FT
acetaminophen-codeine 300 mg-15 mg oral tablet: 1 tab(s) orally every 4 to 6 hours, As Needed -for severe pain MDD:6  NIFEdipine 90 mg oral tablet, extended release: 1 tab(s) orally once a day   acetaminophen 325 mg oral tablet: 3 tab(s) orally every 8 hours, As Needed  aspirin 325 mg oral tablet: 1 tab(s) orally 2 times a day MDD:1  celecoxib 200 mg oral capsule: 1 cap(s) orally once a day MDD:1  Multiple Vitamins oral tablet: 1 tab(s) orally once a day  NIFEdipine 90 mg oral tablet, extended release: 1 tab(s) orally once a day  oxyCODONE 5 mg oral tablet: 1 tab(s) orally every 6 hours, As Needed -severe Pain (4 - 6) MDD:4  pantoprazole 40 mg oral delayed release tablet: 1 tab(s) orally once a day (before a meal) MDD:1  senna leaf extract oral tablet: 2 tab(s) orally once a day (at bedtime)

## 2023-03-09 NOTE — PATIENT PROFILE ADULT - FUNCTIONAL ASSESSMENT - BASIC MOBILITY 6.
3-calculated by average/Not able to assess (calculate score using [facility] averaging method)

## 2023-03-09 NOTE — DISCHARGE NOTE PROVIDER - NSDCCPTREATMENT_GEN_ALL_CORE_FT
PRINCIPAL PROCEDURE  Procedure: Arthoplasty of right hip  Findings and Treatment: osteoarthritis of the right hip

## 2023-03-09 NOTE — PATIENT PROFILE ADULT - FALL HARM RISK - HARM RISK INTERVENTIONS
Assistance with ambulation/Assistance OOB with selected safe patient handling equipment/Communicate Risk of Fall with Harm to all staff/Discuss with provider need for PT consult/Monitor gait and stability/Provide patient with walking aids - walker, cane, crutches/Reinforce activity limits and safety measures with patient and family/Sit up slowly, dangle for a short time, stand at bedside before walking/Tailored Fall Risk Interventions/Use of alarms - bed, chair and/or voice tab/Visual Cue: Yellow wristband and red socks/Bed in lowest position, wheels locked, appropriate side rails in place/Call bell, personal items and telephone in reach/Instruct patient to call for assistance before getting out of bed or chair/Non-slip footwear when patient is out of bed/[place] to call system/Physically safe environment - no spills, clutter or unnecessary equipment/Purposeful Proactive Rounding/Room/bathroom lighting operational, light cord in reach

## 2023-03-09 NOTE — DISCHARGE NOTE PROVIDER - CARE PROVIDER_API CALL
Sree Navarro)  Orthopaedic Surgery  [address] [facility], 10th [place] [address]  Phone: [phone]  Fax: [phone]  Follow Up Time: 2 weeks

## 2023-03-09 NOTE — OCCUPATIONAL THERAPY INITIAL EVALUATION ADULT - DIAGNOSIS, OT EVAL
Patient POD #1 s/p R Posterior REBEKAH, presents with posterior hip precautions, deficits with RLE ROM/strength, balance, activity tolerance, postural control impacting independence with functional activities and mobility.

## 2023-03-09 NOTE — DISCHARGE NOTE PROVIDER - HOSPITAL COURSE
Admitted: 3-9-2023  Surgery: s/p Right posterior REBEKAH  Melissa-op Antibiotics:  Pain control  DVT prophylaxis:  OOB/Physical Therapy  Consultants:  Inpatient events: Admitted: 3-9-2023  Surgery: s/p Right posterior REBEKAH  Melissa-op Antibiotics: Ancef  Pain control  DVT prophylaxis: SCDs, ASA 325mg  OOB/Physical Therapy  Consultants:  Inpatient events:

## 2023-03-09 NOTE — OCCUPATIONAL THERAPY INITIAL EVALUATION ADULT - GENERAL OBSERVATIONS, REHAB EVAL
Patient A&Ox4, agreeable and tolerated session fairly well. Patient c/o of 4/10 R hip pain throughout, received semisupine in bed +R hip bandage C/D/I, +abduction pillow, room air, NAD. Patient A&Ox4, agreeable and tolerated session fairly well. Patient c/o of 5/10 R hip pain throughout, received semisupine in bed +R hip bandage C/D/I, +abduction pillow, room air, NAD.

## 2023-03-09 NOTE — OCCUPATIONAL THERAPY INITIAL EVALUATION ADULT - PERTINENT HX OF CURRENT PROBLEM, REHAB EVAL
62M c/o right hip pain x 1 year without inciting accident or injury. States pain is worst with walking. Denies numbness/tingling to bilateral lower extremities. Pain persists despite conservative measures. Ambulates without assistance. Denies history of a blood clot or seizures. Denies CP/SOB/N/V.

## 2023-03-09 NOTE — OCCUPATIONAL THERAPY INITIAL EVALUATION ADULT - ADDITIONAL COMMENTS
Patient reports living alone in an 5th floor walk up apartment with 3 TWILA. Patient states he is R hand dominant and was independent with all ADL's, IADL's and functional mobility with no AD prior to admission. Patient states upon d/c patients mother going to be staying with him. Patient owns a SC, RW, and an high toilet seat at home. Patient has a bathtub shower. Patient denies recent history of falls.

## 2023-03-09 NOTE — DISCHARGE NOTE PROVIDER - NSDCFUADDINST_GEN_ALL_CORE_FT
ACTIVITY:   - Weight bear as tolerated with assistive device. No strenuous activity, heavy lifting, driving or returning to work until cleared by MD.   - Apply a cold compress to the surgical site several times daily to reduce pain and swelling. For icing, twenty-minute sessions followed by an hour off is recommended. You should ice as frequently as possible. Ice should NEVER be placed directly on the skin. Wearing compression stockings during the first week after surgery can help reduce swelling in your knee, calf and foot, but is not required.      (POSTERIOR HIP REPLACEMENTS)   Hip precautions:   The following precautions will remain in effect for 6 weeks following surgery:   · Do not cross your knees.   · Do not flex your hip (i.e., bring your knee toward your chest) beyond 90 degrees.   · Use a raised toilet seat. Do not use low chairs or couches.   · Sleep with a pillow between your knees.   · Do ot reach down to  items on the floor.       DRESSING/SHOWERING:   (AQUACEL – brown gel dressing)   - You may shower, your dressing is water-resistant. Do not soak in bathtubs. Remove dressing after postop day 7, then leave incision open to air. You may do this yourself (simply peel it off), or you may ask for assistance from your visiting nurse. Keep your incision clean and dry. Do not pick at your incision. Do not apply creams, ointments or oils to your incision until cleared by your surgeon. Do not soak your incision in sitting water (ie tubs, pools, lakes, etc.) until cleared by your surgeon. Do not scrub the incision – instead, allow soap and water to flow over the incision and then pat it dry with a clean towel.     MEDICATION/ANTICOAGULATION:   -You have been prescribed aspirin, as a preventative to help prevent postoperative blood clots. Please take this medication as prescribed.    - You have been prescribed medications for pain:     - Tylenol for mild to moderate pain. Do not exceed 3,000mg daily.     - For more severe pain, take Tylenol with the addition of narcotic pain medication. Take this medication as prescribed. This medication may cause drowsiness or dizziness. Do not operate machinery. This medication may cause constipation.   - For any additional medications, follow instructions on the bottle.    -Try to have regular bowel movements. Take stool softener or laxative if necessary. You may wish to take Miralax daily until you have regular bowel movements.    - If you have been prescribed Aspirin or an anti-inflammatory, please take prilosec (omeprazole) once a day, before breakfast, until no longer taking Aspirin or anti-inflammatory. This will help protect your stomach.   - If you have a pain management physician, please follow-up with them postoperatively.    - If you experience any negative side effects of your medications, please call your surgeon's office to discuss.      FOLLOW-UP:   - Call to schedule an appt with Dr. Navarro for follow up.   - Please follow-up with your primary care physician or any other specialist you see postoperatively, if needed.    - Contact your doctor or go to the emergency room if you experience: fever greater than 101.5, chills, chest pain, difficulty breathing, redness or excessive drainage around the incision, other concerns.

## 2023-03-09 NOTE — OCCUPATIONAL THERAPY INITIAL EVALUATION ADULT - NSOTDMEREC_GEN_A_CORE
Long handled AE, tub transfer bench- educated on use. Patient declining tub transfer bench, willing to purchase hip kit on [employer]

## 2023-03-10 ENCOUNTER — TRANSCRIPTION ENCOUNTER (OUTPATIENT)
Age: 63
End: 2023-03-10

## 2023-03-10 VITALS
TEMPERATURE: 98 F | DIASTOLIC BLOOD PRESSURE: 64 MMHG | OXYGEN SATURATION: 97 % | HEART RATE: 62 BPM | RESPIRATION RATE: 18 BRPM | SYSTOLIC BLOOD PRESSURE: 105 MMHG

## 2023-03-10 LAB
ANION GAP SERPL CALC-SCNC: 11 MMOL/L — SIGNIFICANT CHANGE UP (ref 5–17)
BUN SERPL-MCNC: 19 MG/DL — SIGNIFICANT CHANGE UP (ref 7–23)
CALCIUM SERPL-MCNC: 8.5 MG/DL — SIGNIFICANT CHANGE UP (ref 8.4–10.5)
CHLORIDE SERPL-SCNC: 104 MMOL/L — SIGNIFICANT CHANGE UP (ref 96–108)
CO2 SERPL-SCNC: 25 MMOL/L — SIGNIFICANT CHANGE UP (ref 22–31)
CREAT SERPL-MCNC: 1.15 MG/DL — SIGNIFICANT CHANGE UP (ref 0.5–1.3)
EGFR: 73 ML/MIN/1.73M2 — SIGNIFICANT CHANGE UP
GLUCOSE SERPL-MCNC: 107 MG/DL — HIGH (ref 70–99)
HCT VFR BLD CALC: 40.1 % — SIGNIFICANT CHANGE UP (ref 39–50)
HGB BLD-MCNC: 12.9 G/DL — LOW (ref 13–17)
MCHC RBC-ENTMCNC: 28.4 PG — SIGNIFICANT CHANGE UP (ref 27–34)
MCHC RBC-ENTMCNC: 32.2 GM/DL — SIGNIFICANT CHANGE UP (ref 32–36)
MCV RBC AUTO: 88.1 FL — SIGNIFICANT CHANGE UP (ref 80–100)
NRBC # BLD: 0 /100 WBCS — SIGNIFICANT CHANGE UP (ref 0–0)
PLATELET # BLD AUTO: 180 K/UL — SIGNIFICANT CHANGE UP (ref 150–400)
POTASSIUM SERPL-MCNC: 3.7 MMOL/L — SIGNIFICANT CHANGE UP (ref 3.5–5.3)
POTASSIUM SERPL-SCNC: 3.7 MMOL/L — SIGNIFICANT CHANGE UP (ref 3.5–5.3)
RBC # BLD: 4.55 M/UL — SIGNIFICANT CHANGE UP (ref 4.2–5.8)
RBC # FLD: 14.3 % — SIGNIFICANT CHANGE UP (ref 10.3–14.5)
SODIUM SERPL-SCNC: 140 MMOL/L — SIGNIFICANT CHANGE UP (ref 135–145)
WBC # BLD: 7.36 K/UL — SIGNIFICANT CHANGE UP (ref 3.8–10.5)
WBC # FLD AUTO: 7.36 K/UL — SIGNIFICANT CHANGE UP (ref 3.8–10.5)

## 2023-03-10 PROCEDURE — 36415 COLL VENOUS BLD VENIPUNCTURE: CPT

## 2023-03-10 PROCEDURE — 80048 BASIC METABOLIC PNL TOTAL CA: CPT

## 2023-03-10 PROCEDURE — 86803 HEPATITIS C AB TEST: CPT

## 2023-03-10 PROCEDURE — 97161 PT EVAL LOW COMPLEX 20 MIN: CPT

## 2023-03-10 PROCEDURE — 85027 COMPLETE CBC AUTOMATED: CPT

## 2023-03-10 PROCEDURE — 72170 X-RAY EXAM OF PELVIS: CPT

## 2023-03-10 PROCEDURE — C1713: CPT

## 2023-03-10 PROCEDURE — 97165 OT EVAL LOW COMPLEX 30 MIN: CPT

## 2023-03-10 PROCEDURE — C1776: CPT

## 2023-03-10 PROCEDURE — 97530 THERAPEUTIC ACTIVITIES: CPT

## 2023-03-10 PROCEDURE — 97116 GAIT TRAINING THERAPY: CPT

## 2023-03-10 RX ORDER — PANTOPRAZOLE SODIUM 20 MG/1
1 TABLET, DELAYED RELEASE ORAL
Qty: 10 | Refills: 0
Start: 2023-03-10 | End: 2023-03-19

## 2023-03-10 RX ORDER — NIFEDIPINE 30 MG
1 TABLET, EXTENDED RELEASE 24 HR ORAL
Qty: 0 | Refills: 0 | DISCHARGE
Start: 2023-03-10

## 2023-03-10 RX ORDER — ACETAMINOPHEN 500 MG
3 TABLET ORAL
Qty: 0 | Refills: 0 | DISCHARGE
Start: 2023-03-10

## 2023-03-10 RX ORDER — SENNA PLUS 8.6 MG/1
2 TABLET ORAL
Qty: 0 | Refills: 0 | DISCHARGE
Start: 2023-03-10

## 2023-03-10 RX ORDER — OXYCODONE HYDROCHLORIDE 5 MG/1
1 TABLET ORAL
Qty: 28 | Refills: 0
Start: 2023-03-10 | End: 2023-03-16

## 2023-03-10 RX ORDER — NIFEDIPINE 30 MG
1 TABLET, EXTENDED RELEASE 24 HR ORAL
Qty: 0 | Refills: 0 | DISCHARGE

## 2023-03-10 RX ORDER — ASPIRIN/CALCIUM CARB/MAGNESIUM 324 MG
1 TABLET ORAL
Qty: 20 | Refills: 0
Start: 2023-03-10 | End: 2023-03-19

## 2023-03-10 RX ORDER — CELECOXIB 200 MG/1
1 CAPSULE ORAL
Qty: 10 | Refills: 0
Start: 2023-03-10 | End: 2023-03-19

## 2023-03-10 RX ORDER — PANTOPRAZOLE SODIUM 20 MG/1
40 TABLET, DELAYED RELEASE ORAL
Refills: 0 | Status: DISCONTINUED | OUTPATIENT
Start: 2023-03-10 | End: 2023-03-10

## 2023-03-10 RX ADMIN — CELECOXIB 200 MILLIGRAM(S): 200 CAPSULE ORAL at 14:12

## 2023-03-10 RX ADMIN — Medication 975 MILLIGRAM(S): at 06:24

## 2023-03-10 RX ADMIN — Medication 975 MILLIGRAM(S): at 14:12

## 2023-03-10 RX ADMIN — Medication 1 TABLET(S): at 13:12

## 2023-03-10 RX ADMIN — CELECOXIB 200 MILLIGRAM(S): 200 CAPSULE ORAL at 13:11

## 2023-03-10 RX ADMIN — Medication 90 MILLIGRAM(S): at 05:24

## 2023-03-10 RX ADMIN — Medication 325 MILLIGRAM(S): at 05:24

## 2023-03-10 RX ADMIN — Medication 975 MILLIGRAM(S): at 13:12

## 2023-03-10 RX ADMIN — Medication 975 MILLIGRAM(S): at 05:24

## 2023-03-10 NOTE — PROGRESS NOTE ADULT - SUBJECTIVE AND OBJECTIVE BOX
Ortho Note    Subjective:  Pt comfortable without complaints, pain controlled with current pain medication regimen   Denies CP, SOB, N/V, numbness/tingling   Reviewed plan of care with patient at bedside      Vital Signs Last 24 Hrs  T(C): 36.9 (03-10-23 @ 08:39), Max: 37.2 (03-10-23 @ 06:00)  T(F): 98.5 (03-10-23 @ 08:39), Max: 98.9 (03-10-23 @ 06:00)  HR: 62 (03-10-23 @ 08:39) (62 - 76)  BP: 105/64 (03-10-23 @ 08:39) (105/64 - 117/74)  BP(mean): --  RR: 18 (03-10-23 @ 08:39) (18 - 18)  SpO2: 97% (03-10-23 @ 08:39) (95% - 97%)  AVSS    Objective:    Physical Exam:  General: Pt Alert and oriented, NAD  Right hip aquacel DSG C/D/I, abductor pillow in place   Pulses: +2 pedal pulses wwp toes  Sensation: silt intact bilateral lower extremities  Motor: EHL/FHL/TA/GS- 5/5 bilateral lower extremities      Plan of Care:  A/P: 62yMale POD#2 s/p Right Posterior REBEKAH  - afebrile  - Pain Control- tylenol 975mg PO Q8h, celebrex 200mg PO Daily, Dilaudid 0.5mg Q4h prn breakthrough pain, Oxycodone 5-10mg PO Q3h prn moderate to severe pain   - DVT ppx: aspirin 325mg PO BID   - PT, WBS: WBAT  - ambulate with PT as tolerated  - bowel regimen, IS use, PPI   - Dispo- home pending pt clearance     Ortho Pager [phone]

## 2023-03-10 NOTE — DISCHARGE NOTE NURSING/CASE MANAGEMENT/SOCIAL WORK - NSDCPEFALRISK_GEN_ALL_CORE
For information on Fall & Injury Prevention, visit: [email or web address] OR  [email or web address] OR  https://www.cdc.gov/steadi/patient.html

## 2023-03-10 NOTE — PROGRESS NOTE ADULT - SUBJECTIVE AND OBJECTIVE BOX
Orthopedics Progress Note    Procedure: right REBEKAH   Surgeon: Dr Navarro     Pt comfortable, without complaints. Pain very well controlled.   Denies CP, SOB, N/V, numbness/tingling     Vital Signs Last 24 Hrs  T(C): 37.2 (10 Mar 2023 06:00), Max: 37.3 (09 Mar 2023 20:53)  T(F): 98.9 (10 Mar 2023 06:00), Max: 99.1 (09 Mar 2023 20:53)  HR: 76 (10 Mar 2023 06:00) (64 - 76)  BP: 117/74 (10 Mar 2023 06:00) (116/70 - 129/69)  BP(mean): --  RR: 18 (10 Mar 2023 06:00) (16 - 18)  SpO2: 95% (10 Mar 2023 06:00) (95% - 97%)    Parameters below as of 10 Mar 2023 06:00  Patient On (Oxygen Delivery Method): room air    AVSS, NAD  General: Pt Alert and oriented, comfortable  Dressing C/D/I, aquacel, abduction pillow in place   Sensation intact and equal BLE   Motor ehl/ta/gs/fhl 5/5 BLE   Pulses dp palpable BLE, skin warm and well perfused, cap refill brisk     Post op XR: s/p  right REBEKAH hardware in place     A/P: 62yMale s/p R Posterior REBEKAH on 3/8  - Stable  - Pain Control  - DVT ppx: SCDs  BID  - Melissa-op abx: Ancef  - PT, WBS: WBAT   - F/U AM Labs  - Likely to clear today

## 2023-03-10 NOTE — DISCHARGE NOTE NURSING/CASE MANAGEMENT/SOCIAL WORK - PATIENT PORTAL LINK FT
You can access the FollowMyHealth Patient Portal offered by [facility] by registering at the following website: [email or web address]. By joining [employer]’s FollowMyHealth portal, you will also be able to view your health information using other applications (apps) compatible with our system.

## 2023-03-13 DIAGNOSIS — D36.10 BENIGN NEOPLASM OF PERIPHERAL NERVES AND AUTONOMIC NERVOUS SYSTEM, UNSPECIFIED: ICD-10-CM

## 2023-03-13 DIAGNOSIS — I10 ESSENTIAL (PRIMARY) HYPERTENSION: ICD-10-CM

## 2023-03-13 DIAGNOSIS — M16.11 UNILATERAL PRIMARY OSTEOARTHRITIS, RIGHT HIP: ICD-10-CM

## 2023-03-14 PROBLEM — M16.11 UNILATERAL PRIMARY OSTEOARTHRITIS, RIGHT HIP: Chronic | Status: ACTIVE | Noted: 2023-03-07

## 2023-03-14 PROBLEM — D36.10 BENIGN NEOPLASM OF PERIPHERAL NERVES AND AUTONOMIC NERVOUS SYSTEM, UNSPECIFIED: Chronic | Status: ACTIVE | Noted: 2023-03-07

## 2023-03-31 ENCOUNTER — APPOINTMENT (OUTPATIENT)
Dept: ORTHOPEDIC SURGERY | Facility: CLINIC | Age: 63
End: 2023-03-31
Payer: MEDICARE

## 2023-03-31 DIAGNOSIS — M16.11 UNILATERAL PRIMARY OSTEOARTHRITIS, RIGHT HIP: ICD-10-CM

## 2023-03-31 PROCEDURE — 73521 X-RAY EXAM HIPS BI 2 VIEWS: CPT

## 2023-03-31 PROCEDURE — 99024 POSTOP FOLLOW-UP VISIT: CPT

## 2023-03-31 NOTE — REASON FOR VISIT
[Follow-Up Visit] : a follow-up visit for [Artificial Hip Joint] : an artificial hip joint [Joint Replacement Surgery, Aftercare] : joint replacement surgery, aftercare [FreeTextEntry2] : Rt hip replaced 3/8. 1st POA. Pt is doing PT which he states is going well along with the healing process.

## 2023-03-31 NOTE — PHYSICAL EXAM
[de-identified] : Right hip exam today wound is well-healed leg lengths are equal neurovascular intact distally appropriate swelling. [de-identified] : Hip radiographs were ordered today AP and lateral both hips were obtained showing a well-positioned collared total hip prosthesis.

## 2023-03-31 NOTE — HISTORY OF PRESENT ILLNESS
[de-identified] : First visit 3-1/2 weeks status post right hip replacement doing very well very pleased with his result so far

## 2023-03-31 NOTE — DISCUSSION/SUMMARY
[de-identified] : Patient doing very well he will be transition to outpatient physical therapy follow-up in 5 weeks time.

## 2023-06-08 RX ORDER — AZITHROMYCIN 250 MG/1
250 TABLET, FILM COATED ORAL
Qty: 6 | Refills: 0 | Status: ACTIVE | COMMUNITY
Start: 2023-06-08 | End: 1900-01-01

## 2023-06-30 NOTE — OCCUPATIONAL THERAPY INITIAL EVALUATION ADULT - MODIFIED CLINICAL TEST OF SENSORY INTEGRATION IN BALANCE TEST
Patient functionally mobilized from bed<>bathroom in the hallway with CGA and RW. Patient noted with decreased weight shifting, step length, dynamic standing balance requiring min verbal/tactile cues for proper positioning and safety.
Unknown

## 2023-08-28 NOTE — PATIENT PROFILE ADULT - OVER THE PAST TWO WEEKS HAVE YOU FELT DOWN, DEPRESSED OR HOPELESS?
Treatment Area Prep: acetone Blade: 10 blade scalpel Treatment Areas: face and neck Pre-Procedure Text: The patient was placed in a recumbant position on the procedure table. Post-Care Instructions: I reviewed with the patient in detail post-care instructions. Detail Level: Zone Post-Procedure Instructions: Following the dermaplane procedure, Oxymist treatment was applied to the treatment areas. Moisturizer and SPF was applied. no

## 2023-10-27 NOTE — OCCUPATIONAL THERAPY INITIAL EVALUATION ADULT - FINE MOTOR COORDINATION EXAM
[facility]  Hospitalist Discharge Summary     Patient: Conor Merlos Admitting Physician: Luis Wu MD   YOB: 1960 Discharge Physician: Eddie Schultz MD   MRN: [id number]    Admission Date: 10/25/2023     Discharge date: 10/27/2023      Indication for Admission:  Shortness of breath [R06.02]  Hypoxia [R09.02]  Multiple subsegmental pulmonary emboli without acute cor pulmonale (CMD) [I26.94]    Discharge Diagnoses:   Bilateral PE  Metastatic prostate cancer  Peritoneal carcinomatosis  Bilateral hydrenephrosis    Hospital Course:     Pt admitted and started on heparin infusion. His cp resolved completely. He had CT guided biopsy, path report pending. PSA came back elevated at close to 7K. Oncology consulted and they would like to start pt on Firmagon. Unfortunately this hospital is out of his insurance net work and this medications will cost him more than 4K, which he can't afford. Pt will be [facility] to [facility] which is in his network.   Urology were consulted and were following pt for the prostate cancer and bilateral hydronephrosis.     Consults:   cardiology, hematology/oncology, urology    Discharge Exam:  Visit Vitals  BP (!) 145/82   Pulse 81   Temp 98.1 °F (36.7 °C) (Oral)   Resp 18   Ht 5' 9\" (1.753 m)   Wt 79.5 kg (175 lb 4.3 oz)   SpO2 96%   BMI 25.88 kg/m²     Visit Vitals  BP (!) 145/82   Pulse 81   Temp 98.1 °F (36.7 °C) (Oral)   Resp 18   Ht 5' 9\" (1.753 m)   Wt 79.5 kg (175 lb 4.3 oz)   SpO2 96%   BMI 25.88 kg/m²       General Appearance:    Alert, cooperative, no distress   Head:    Normocephalic, without obvious abnormality, atraumatic   Eyes:    conjunctivae/corneas    clear, extraocular movements intact       Nose:   Nares normal, septum midline, mucosa normal, no drainage    or sinus tenderness       Neck:   Supple, symmetrical, trachea midline, no adenopathy;        thyroid:  No enlargement/tenderness/nodules; no  carotid    bruit or jugular venous distention       Lungs:     Clear to auscultation bilaterally, respirations unlabored       Heart:    Regular rate and rhythm, S1 and S2 normal, no murmur, rub   or gallop   Abdomen:     Soft, non-tender, bowel sounds active all four quadrants,     no masses, no organomegaly           Extremities:   Extremities normal, atraumatic, no cyanosis or edema   Pulses:   2+ and symmetric all extremities   Skin:   Skin color, texture, turgor normal, no rashes or lesions       Neurologic:   Cranial nerves II-XII intact. Normal strength, sensation and      reflexes throughout       Significant Diagnostic Studies:   See PACS/RADS    Discharge Medications:  See AVS     Disposition: transferring to a different hospital    Advanced Directives/Goals of Care:  Patient is decisional: Yes  Power Of  designee/healthcare agent - Not Established:  Code Status: Full Resuscitation     DISCHARGE INSTRUCTIONS     As part of your transition home, we are providing you with this set of discharge instructions, as a guide to help you in that process. In addition to the care provided during your hospital stay, there may be upcoming clinic visits, laboratory appointments, and/or results noted on this After Visit Summary (AVS).     To assist the physicians caring for you during this transition, we would like you remind you of the following:    - Please call a Provider for help if you experience any of the following: Any questions or concerns  - Activity Instructions: Normal activity as tolerated  - Dressing/Wound Instructions: Not applicable  - Lifting & Weight-bearing Instructions: No restrictions  - Diet: regular  - Medication changes:   See AVS  - Follow up with   - Mary Jo Melendez MD within 1 week       If you have any questions 24-48 hours after discharge, please feel free to contact the hospital unit/department you were discharged from.      Time spent on discharge was >30 minutes. Greater than 50% of  this face-to-face visit was spent on counseling and/or coordination of care. Patient verbalized understanding and is in agreement with treatment plan.    Copy of note will be sent to PCP for discharge recommendations.    Eddie Schultz MD     Left UE/Right UE

## 2024-12-07 NOTE — PATIENT PROFILE ADULT - SURGICAL SITE DESCRIPTION
pt was brought into ED by group home ([facility]) aid for C/O bladder pain, burning with urination and blood in the urine. pt recently had mckeon catheter removed 1 week ago in rehab. R hip

## 2025-01-10 ENCOUNTER — APPOINTMENT (OUTPATIENT)
Dept: ORTHOPEDIC SURGERY | Facility: CLINIC | Age: 65
End: 2025-01-10
Payer: MEDICARE

## 2025-01-10 VITALS — WEIGHT: 260 LBS | BODY MASS INDEX: 31.66 KG/M2 | HEIGHT: 76 IN

## 2025-01-10 DIAGNOSIS — M16.12 UNILATERAL PRIMARY OSTEOARTHRITIS, LEFT HIP: ICD-10-CM

## 2025-01-10 PROCEDURE — 99205 OFFICE O/P NEW HI 60 MIN: CPT

## 2025-01-10 PROCEDURE — 99215 OFFICE O/P EST HI 40 MIN: CPT

## 2025-01-10 PROCEDURE — 73502 X-RAY EXAM HIP UNI 2-3 VIEWS: CPT

## 2025-02-28 NOTE — DISCHARGE NOTE PROVIDER - NSDCADMDATE_GEN_ALL_CORE_FT
INACCURATE BLOOD SUGAR READING. Lab notified. RONNELL Alcala notified. Help desk to be called to get inaccurate reading removed from chart   08-Mar-2023 09:13

## 2025-05-15 ENCOUNTER — NON-APPOINTMENT (OUTPATIENT)
Age: 65
End: 2025-05-15

## 2025-06-24 ENCOUNTER — NON-APPOINTMENT (OUTPATIENT)
Age: 65
End: 2025-06-24

## 2025-07-03 ENCOUNTER — APPOINTMENT (OUTPATIENT)
Dept: INTERNAL MEDICINE | Facility: CLINIC | Age: 65
End: 2025-07-03

## 2025-07-03 ENCOUNTER — APPOINTMENT (OUTPATIENT)
Dept: INTERNAL MEDICINE | Facility: CLINIC | Age: 65
End: 2025-07-03
Payer: MEDICARE

## 2025-07-03 VITALS
SYSTOLIC BLOOD PRESSURE: 127 MMHG | DIASTOLIC BLOOD PRESSURE: 81 MMHG | OXYGEN SATURATION: 97 % | BODY MASS INDEX: 28.61 KG/M2 | HEART RATE: 67 BPM | WEIGHT: 235 LBS

## 2025-07-03 PROBLEM — Z01.818 PRE-OP EVALUATION: Status: ACTIVE | Noted: 2025-07-03

## 2025-07-03 PROCEDURE — 93000 ELECTROCARDIOGRAM COMPLETE: CPT

## 2025-07-03 PROCEDURE — G2211 COMPLEX E/M VISIT ADD ON: CPT

## 2025-07-03 PROCEDURE — 36415 COLL VENOUS BLD VENIPUNCTURE: CPT

## 2025-07-03 PROCEDURE — 99203 OFFICE O/P NEW LOW 30 MIN: CPT

## 2025-07-07 VITALS
RESPIRATION RATE: 16 BRPM | HEIGHT: 76 IN | HEART RATE: 73 BPM | WEIGHT: 257.94 LBS | DIASTOLIC BLOOD PRESSURE: 88 MMHG | TEMPERATURE: 98 F | SYSTOLIC BLOOD PRESSURE: 147 MMHG | OXYGEN SATURATION: 97 %

## 2025-07-07 LAB
ALBUMIN SERPL ELPH-MCNC: 4.4 G/DL
ALP BLD-CCNC: 145 U/L
ALT SERPL-CCNC: 26 U/L
ANION GAP SERPL CALC-SCNC: 14 MMOL/L
APTT BLD: 31.1 SEC
AST SERPL-CCNC: 18 U/L
BASOPHILS # BLD AUTO: 0.02 K/UL
BASOPHILS NFR BLD AUTO: 0.3 %
BILIRUB SERPL-MCNC: 0.8 MG/DL
BUN SERPL-MCNC: 13 MG/DL
CALCIUM SERPL-MCNC: 9 MG/DL
CHLORIDE SERPL-SCNC: 109 MMOL/L
CO2 SERPL-SCNC: 23 MMOL/L
CREAT SERPL-MCNC: 1.12 MG/DL
EGFRCR SERPLBLD CKD-EPI 2021: 74 ML/MIN/1.73M2
EOSINOPHIL # BLD AUTO: 0.06 K/UL
EOSINOPHIL NFR BLD AUTO: 0.9 %
GLUCOSE SERPL-MCNC: 100 MG/DL
HCT VFR BLD CALC: 47.1 %
HGB BLD-MCNC: 15.2 G/DL
IMM GRANULOCYTES NFR BLD AUTO: 0.3 %
INR PPP: 0.98 RATIO
LYMPHOCYTES # BLD AUTO: 1.71 K/UL
LYMPHOCYTES NFR BLD AUTO: 27 %
MAN DIFF?: NORMAL
MCHC RBC-ENTMCNC: 28.2 PG
MCHC RBC-ENTMCNC: 32.3 G/DL
MCV RBC AUTO: 87.4 FL
MONOCYTES # BLD AUTO: 0.46 K/UL
MONOCYTES NFR BLD AUTO: 7.3 %
NEUTROPHILS # BLD AUTO: 4.07 K/UL
NEUTROPHILS NFR BLD AUTO: 64.2 %
PLATELET # BLD AUTO: 204 K/UL
POTASSIUM SERPL-SCNC: 4 MMOL/L
PROT SERPL-MCNC: 7.4 G/DL
PT BLD: 11.7 SEC
RBC # BLD: 5.39 M/UL
RBC # FLD: 14.8 %
SODIUM SERPL-SCNC: 147 MMOL/L
WBC # FLD AUTO: 6.34 K/UL

## 2025-07-07 RX ORDER — POVIDONE-IODINE 7.5 %
1 SOLUTION, NON-ORAL TOPICAL ONCE
Refills: 0 | Status: COMPLETED | OUTPATIENT
Start: 2025-07-23 | End: 2025-07-23

## 2025-07-23 ENCOUNTER — TRANSCRIPTION ENCOUNTER (OUTPATIENT)
Age: 65
End: 2025-07-23

## 2025-07-23 ENCOUNTER — APPOINTMENT (OUTPATIENT)
Dept: ORTHOPEDIC SURGERY | Facility: HOSPITAL | Age: 65
End: 2025-07-23

## 2025-07-23 ENCOUNTER — RESULT REVIEW (OUTPATIENT)
Age: 65
End: 2025-07-23

## 2025-07-23 ENCOUNTER — INPATIENT (INPATIENT)
Facility: HOSPITAL | Age: 65
LOS: 1 days | Discharge: HOME HEALTH | DRG: 470 | End: 2025-07-25
Attending: SPECIALIST | Admitting: SPECIALIST
Payer: MEDICARE

## 2025-07-23 DIAGNOSIS — Z86.16 PERSONAL HISTORY OF COVID-19: ICD-10-CM

## 2025-07-23 DIAGNOSIS — Z96.651 PRESENCE OF RIGHT ARTIFICIAL KNEE JOINT: Chronic | ICD-10-CM

## 2025-07-23 DIAGNOSIS — Z79.82 LONG TERM (CURRENT) USE OF ASPIRIN: ICD-10-CM

## 2025-07-23 DIAGNOSIS — Z96.641 PRESENCE OF RIGHT ARTIFICIAL HIP JOINT: ICD-10-CM

## 2025-07-23 DIAGNOSIS — M16.12 UNILATERAL PRIMARY OSTEOARTHRITIS, LEFT HIP: ICD-10-CM

## 2025-07-23 DIAGNOSIS — I10 ESSENTIAL (PRIMARY) HYPERTENSION: ICD-10-CM

## 2025-07-23 DIAGNOSIS — Z96.641 PRESENCE OF RIGHT ARTIFICIAL HIP JOINT: Chronic | ICD-10-CM

## 2025-07-23 DIAGNOSIS — Z88.0 ALLERGY STATUS TO PENICILLIN: ICD-10-CM

## 2025-07-23 DIAGNOSIS — E78.5 HYPERLIPIDEMIA, UNSPECIFIED: ICD-10-CM

## 2025-07-23 DIAGNOSIS — Z86.018 PERSONAL HISTORY OF OTHER BENIGN NEOPLASM: Chronic | ICD-10-CM

## 2025-07-23 DIAGNOSIS — Z96.651 PRESENCE OF RIGHT ARTIFICIAL KNEE JOINT: ICD-10-CM

## 2025-07-23 PROCEDURE — 27130 TOTAL HIP ARTHROPLASTY: CPT | Mod: AS,LT

## 2025-07-23 PROCEDURE — 72170 X-RAY EXAM OF PELVIS: CPT | Mod: 26

## 2025-07-23 PROCEDURE — 27130 TOTAL HIP ARTHROPLASTY: CPT | Mod: LT

## 2025-07-23 DEVICE — IMPLANTABLE DEVICE: Type: IMPLANTABLE DEVICE | Site: LEFT | Status: FUNCTIONAL

## 2025-07-23 DEVICE — PINNACLE ALTRX NEUTRAL36 X 56: Type: IMPLANTABLE DEVICE | Site: LEFT | Status: FUNCTIONAL

## 2025-07-23 DEVICE — SHELL ACET PINNACLE SECTOR W/ GRIPTION 56MM: Type: IMPLANTABLE DEVICE | Site: LEFT | Status: FUNCTIONAL

## 2025-07-23 DEVICE — HEAD FEM CEM TAPR PLUS 1.5MM 12/14X36MM: Type: IMPLANTABLE DEVICE | Site: LEFT | Status: FUNCTIONAL

## 2025-07-23 RX ORDER — OXYCODONE HYDROCHLORIDE 30 MG/1
5 TABLET ORAL
Refills: 0 | Status: DISCONTINUED | OUTPATIENT
Start: 2025-07-23 | End: 2025-07-23

## 2025-07-23 RX ORDER — ASPIRIN 325 MG
325 TABLET ORAL DAILY
Refills: 0 | Status: DISCONTINUED | OUTPATIENT
Start: 2025-07-23 | End: 2025-07-23

## 2025-07-23 RX ORDER — CELECOXIB 50 MG/1
200 CAPSULE ORAL EVERY 12 HOURS
Refills: 0 | Status: DISCONTINUED | OUTPATIENT
Start: 2025-07-24 | End: 2025-07-25

## 2025-07-23 RX ORDER — ACETAMINOPHEN 500 MG/5ML
1000 LIQUID (ML) ORAL ONCE
Refills: 0 | Status: COMPLETED | OUTPATIENT
Start: 2025-07-23 | End: 2025-07-23

## 2025-07-23 RX ORDER — ONDANSETRON HCL/PF 4 MG/2 ML
4 VIAL (ML) INJECTION EVERY 6 HOURS
Refills: 0 | Status: DISCONTINUED | OUTPATIENT
Start: 2025-07-23 | End: 2025-07-25

## 2025-07-23 RX ORDER — ASPIRIN 325 MG
325 TABLET ORAL DAILY
Refills: 0 | Status: DISCONTINUED | OUTPATIENT
Start: 2025-07-24 | End: 2025-07-25

## 2025-07-23 RX ORDER — POLYETHYLENE GLYCOL 3350 17 G/17G
17 POWDER, FOR SOLUTION ORAL AT BEDTIME
Refills: 0 | Status: DISCONTINUED | OUTPATIENT
Start: 2025-07-23 | End: 2025-07-25

## 2025-07-23 RX ORDER — HYDROMORPHONE/SOD CHLOR,ISO/PF 2 MG/10 ML
0.5 SYRINGE (ML) INJECTION
Refills: 0 | Status: DISCONTINUED | OUTPATIENT
Start: 2025-07-23 | End: 2025-07-25

## 2025-07-23 RX ORDER — ATORVASTATIN CALCIUM 80 MG/1
20 TABLET, FILM COATED ORAL AT BEDTIME
Refills: 0 | Status: DISCONTINUED | OUTPATIENT
Start: 2025-07-23 | End: 2025-07-25

## 2025-07-23 RX ORDER — NIFEDIPINE 30 MG
1 TABLET, EXTENDED RELEASE 24 HR ORAL
Refills: 0 | DISCHARGE

## 2025-07-23 RX ORDER — OXYCODONE HYDROCHLORIDE 30 MG/1
10 TABLET ORAL EVERY 4 HOURS
Refills: 0 | Status: DISCONTINUED | OUTPATIENT
Start: 2025-07-23 | End: 2025-07-25

## 2025-07-23 RX ORDER — ACETAMINOPHEN 500 MG/5ML
650 LIQUID (ML) ORAL EVERY 6 HOURS
Refills: 0 | Status: DISCONTINUED | OUTPATIENT
Start: 2025-07-23 | End: 2025-07-25

## 2025-07-23 RX ORDER — ATORVASTATIN CALCIUM 80 MG/1
1 TABLET, FILM COATED ORAL
Refills: 0 | DISCHARGE

## 2025-07-23 RX ORDER — SENNA 187 MG
2 TABLET ORAL AT BEDTIME
Refills: 0 | Status: DISCONTINUED | OUTPATIENT
Start: 2025-07-23 | End: 2025-07-25

## 2025-07-23 RX ORDER — MAGNESIUM HYDROXIDE 400 MG/5ML
30 SUSPENSION ORAL DAILY
Refills: 0 | Status: DISCONTINUED | OUTPATIENT
Start: 2025-07-23 | End: 2025-07-25

## 2025-07-23 RX ORDER — CELECOXIB 50 MG/1
200 CAPSULE ORAL ONCE
Refills: 0 | Status: COMPLETED | OUTPATIENT
Start: 2025-07-23 | End: 2025-07-23

## 2025-07-23 RX ORDER — NIFEDIPINE 30 MG
90 TABLET, EXTENDED RELEASE 24 HR ORAL DAILY
Refills: 0 | Status: DISCONTINUED | OUTPATIENT
Start: 2025-07-23 | End: 2025-07-25

## 2025-07-23 RX ORDER — APREPITANT 40 MG/1
40 CAPSULE ORAL ONCE
Refills: 0 | Status: COMPLETED | OUTPATIENT
Start: 2025-07-23 | End: 2025-07-23

## 2025-07-23 RX ORDER — CEFAZOLIN SODIUM IN 0.9 % NACL 3 G/100 ML
2000 INTRAVENOUS SOLUTION, PIGGYBACK (ML) INTRAVENOUS EVERY 8 HOURS
Refills: 0 | Status: COMPLETED | OUTPATIENT
Start: 2025-07-23 | End: 2025-07-23

## 2025-07-23 RX ORDER — SODIUM CHLORIDE 9 G/1000ML
1000 INJECTION, SOLUTION INTRAVENOUS
Refills: 0 | Status: DISCONTINUED | OUTPATIENT
Start: 2025-07-23 | End: 2025-07-25

## 2025-07-23 RX ORDER — OXYCODONE HYDROCHLORIDE 30 MG/1
5 TABLET ORAL EVERY 4 HOURS
Refills: 0 | Status: DISCONTINUED | OUTPATIENT
Start: 2025-07-23 | End: 2025-07-25

## 2025-07-23 RX ADMIN — Medication 1 APPLICATION(S): at 06:22

## 2025-07-23 RX ADMIN — SODIUM CHLORIDE 100 MILLILITER(S): 9 INJECTION, SOLUTION INTRAVENOUS at 11:26

## 2025-07-23 RX ADMIN — Medication 100 MILLIGRAM(S): at 23:36

## 2025-07-23 RX ADMIN — ATORVASTATIN CALCIUM 20 MILLIGRAM(S): 80 TABLET, FILM COATED ORAL at 22:30

## 2025-07-23 RX ADMIN — Medication 1 APPLICATION(S): at 06:23

## 2025-07-23 RX ADMIN — Medication 100 MILLIGRAM(S): at 15:51

## 2025-07-23 RX ADMIN — Medication 1000 MILLIGRAM(S): at 06:21

## 2025-07-23 RX ADMIN — APREPITANT 40 MILLIGRAM(S): 40 CAPSULE ORAL at 06:21

## 2025-07-23 RX ADMIN — CELECOXIB 200 MILLIGRAM(S): 50 CAPSULE ORAL at 06:22

## 2025-07-24 ENCOUNTER — TRANSCRIPTION ENCOUNTER (OUTPATIENT)
Age: 65
End: 2025-07-24

## 2025-07-24 LAB
ANION GAP SERPL CALC-SCNC: 12 MMOL/L — SIGNIFICANT CHANGE UP (ref 5–17)
BUN SERPL-MCNC: 18 MG/DL — SIGNIFICANT CHANGE UP (ref 7–23)
CALCIUM SERPL-MCNC: 8.6 MG/DL — SIGNIFICANT CHANGE UP (ref 8.4–10.5)
CHLORIDE SERPL-SCNC: 106 MMOL/L — SIGNIFICANT CHANGE UP (ref 96–108)
CO2 SERPL-SCNC: 24 MMOL/L — SIGNIFICANT CHANGE UP (ref 22–31)
CREAT SERPL-MCNC: 1.03 MG/DL — SIGNIFICANT CHANGE UP (ref 0.5–1.3)
EGFR: 82 ML/MIN/1.73M2 — SIGNIFICANT CHANGE UP
EGFR: 82 ML/MIN/1.73M2 — SIGNIFICANT CHANGE UP
GLUCOSE SERPL-MCNC: 119 MG/DL — HIGH (ref 70–99)
HCT VFR BLD CALC: 43.5 % — SIGNIFICANT CHANGE UP (ref 39–50)
HGB BLD-MCNC: 14 G/DL — SIGNIFICANT CHANGE UP (ref 13–17)
MCHC RBC-ENTMCNC: 27.9 PG — SIGNIFICANT CHANGE UP (ref 27–34)
MCHC RBC-ENTMCNC: 32.2 G/DL — SIGNIFICANT CHANGE UP (ref 32–36)
MCV RBC AUTO: 86.8 FL — SIGNIFICANT CHANGE UP (ref 80–100)
NRBC # BLD AUTO: 0 K/UL — SIGNIFICANT CHANGE UP (ref 0–0)
NRBC # FLD: 0 K/UL — SIGNIFICANT CHANGE UP (ref 0–0)
NRBC BLD AUTO-RTO: 0 /100 WBCS — SIGNIFICANT CHANGE UP (ref 0–0)
PLATELET # BLD AUTO: 192 K/UL — SIGNIFICANT CHANGE UP (ref 150–400)
PMV BLD: 10.9 FL — SIGNIFICANT CHANGE UP (ref 7–13)
POTASSIUM SERPL-MCNC: 3.6 MMOL/L — SIGNIFICANT CHANGE UP (ref 3.5–5.3)
POTASSIUM SERPL-SCNC: 3.6 MMOL/L — SIGNIFICANT CHANGE UP (ref 3.5–5.3)
RBC # BLD: 5.01 M/UL — SIGNIFICANT CHANGE UP (ref 4.2–5.8)
RBC # FLD: 14.2 % — SIGNIFICANT CHANGE UP (ref 10.3–14.5)
SODIUM SERPL-SCNC: 142 MMOL/L — SIGNIFICANT CHANGE UP (ref 135–145)
WBC # BLD: 8.6 K/UL — SIGNIFICANT CHANGE UP (ref 3.8–10.5)
WBC # FLD AUTO: 8.6 K/UL — SIGNIFICANT CHANGE UP (ref 3.8–10.5)

## 2025-07-24 PROCEDURE — 99222 1ST HOSP IP/OBS MODERATE 55: CPT

## 2025-07-24 RX ADMIN — Medication 325 MILLIGRAM(S): at 11:16

## 2025-07-24 RX ADMIN — SODIUM CHLORIDE 100 MILLILITER(S): 9 INJECTION, SOLUTION INTRAVENOUS at 00:56

## 2025-07-24 RX ADMIN — CELECOXIB 200 MILLIGRAM(S): 50 CAPSULE ORAL at 06:28

## 2025-07-24 RX ADMIN — Medication 40 MILLIGRAM(S): at 06:29

## 2025-07-24 RX ADMIN — CELECOXIB 200 MILLIGRAM(S): 50 CAPSULE ORAL at 17:08

## 2025-07-24 RX ADMIN — ATORVASTATIN CALCIUM 20 MILLIGRAM(S): 80 TABLET, FILM COATED ORAL at 21:46

## 2025-07-24 RX ADMIN — POLYETHYLENE GLYCOL 3350 17 GRAM(S): 17 POWDER, FOR SOLUTION ORAL at 21:46

## 2025-07-24 RX ADMIN — Medication 2 TABLET(S): at 21:46

## 2025-07-25 ENCOUNTER — TRANSCRIPTION ENCOUNTER (OUTPATIENT)
Age: 65
End: 2025-07-25

## 2025-07-25 VITALS
OXYGEN SATURATION: 95 % | DIASTOLIC BLOOD PRESSURE: 70 MMHG | RESPIRATION RATE: 18 BRPM | SYSTOLIC BLOOD PRESSURE: 111 MMHG | TEMPERATURE: 98 F | HEART RATE: 64 BPM

## 2025-07-25 LAB
ANION GAP SERPL CALC-SCNC: 13 MMOL/L — SIGNIFICANT CHANGE UP (ref 5–17)
BUN SERPL-MCNC: 24 MG/DL — HIGH (ref 7–23)
CALCIUM SERPL-MCNC: 8.6 MG/DL — SIGNIFICANT CHANGE UP (ref 8.4–10.5)
CHLORIDE SERPL-SCNC: 106 MMOL/L — SIGNIFICANT CHANGE UP (ref 96–108)
CO2 SERPL-SCNC: 22 MMOL/L — SIGNIFICANT CHANGE UP (ref 22–31)
CREAT SERPL-MCNC: 1.08 MG/DL — SIGNIFICANT CHANGE UP (ref 0.5–1.3)
EGFR: 77 ML/MIN/1.73M2 — SIGNIFICANT CHANGE UP
EGFR: 77 ML/MIN/1.73M2 — SIGNIFICANT CHANGE UP
GLUCOSE SERPL-MCNC: 98 MG/DL — SIGNIFICANT CHANGE UP (ref 70–99)
HCT VFR BLD CALC: 42.2 % — SIGNIFICANT CHANGE UP (ref 39–50)
HGB BLD-MCNC: 13.6 G/DL — SIGNIFICANT CHANGE UP (ref 13–17)
MCHC RBC-ENTMCNC: 28.1 PG — SIGNIFICANT CHANGE UP (ref 27–34)
MCHC RBC-ENTMCNC: 32.2 G/DL — SIGNIFICANT CHANGE UP (ref 32–36)
MCV RBC AUTO: 87.2 FL — SIGNIFICANT CHANGE UP (ref 80–100)
NRBC # BLD AUTO: 0 K/UL — SIGNIFICANT CHANGE UP (ref 0–0)
NRBC # FLD: 0 K/UL — SIGNIFICANT CHANGE UP (ref 0–0)
NRBC BLD AUTO-RTO: 0 /100 WBCS — SIGNIFICANT CHANGE UP (ref 0–0)
PLATELET # BLD AUTO: 176 K/UL — SIGNIFICANT CHANGE UP (ref 150–400)
PMV BLD: 10.6 FL — SIGNIFICANT CHANGE UP (ref 7–13)
POTASSIUM SERPL-MCNC: 3.8 MMOL/L — SIGNIFICANT CHANGE UP (ref 3.5–5.3)
POTASSIUM SERPL-SCNC: 3.8 MMOL/L — SIGNIFICANT CHANGE UP (ref 3.5–5.3)
RBC # BLD: 4.84 M/UL — SIGNIFICANT CHANGE UP (ref 4.2–5.8)
RBC # FLD: 14.5 % — SIGNIFICANT CHANGE UP (ref 10.3–14.5)
SODIUM SERPL-SCNC: 141 MMOL/L — SIGNIFICANT CHANGE UP (ref 135–145)
WBC # BLD: 7.61 K/UL — SIGNIFICANT CHANGE UP (ref 3.8–10.5)
WBC # FLD AUTO: 7.61 K/UL — SIGNIFICANT CHANGE UP (ref 3.8–10.5)

## 2025-07-25 PROCEDURE — 97161 PT EVAL LOW COMPLEX 20 MIN: CPT

## 2025-07-25 PROCEDURE — 97116 GAIT TRAINING THERAPY: CPT

## 2025-07-25 PROCEDURE — 36415 COLL VENOUS BLD VENIPUNCTURE: CPT

## 2025-07-25 PROCEDURE — 97530 THERAPEUTIC ACTIVITIES: CPT

## 2025-07-25 PROCEDURE — 80048 BASIC METABOLIC PNL TOTAL CA: CPT

## 2025-07-25 PROCEDURE — 85027 COMPLETE CBC AUTOMATED: CPT

## 2025-07-25 PROCEDURE — 97165 OT EVAL LOW COMPLEX 30 MIN: CPT

## 2025-07-25 PROCEDURE — C1776: CPT

## 2025-07-25 PROCEDURE — 72170 X-RAY EXAM OF PELVIS: CPT

## 2025-07-25 PROCEDURE — 99232 SBSQ HOSP IP/OBS MODERATE 35: CPT

## 2025-07-25 RX ORDER — OXYCODONE HYDROCHLORIDE 30 MG/1
1 TABLET ORAL
Qty: 42 | Refills: 0
Start: 2025-07-25 | End: 2025-07-31

## 2025-07-25 RX ORDER — CELECOXIB 50 MG/1
1 CAPSULE ORAL
Qty: 14 | Refills: 0
Start: 2025-07-25 | End: 2025-07-31

## 2025-07-25 RX ORDER — ACETAMINOPHEN 500 MG/5ML
2 LIQUID (ML) ORAL
Qty: 56 | Refills: 0
Start: 2025-07-25 | End: 2025-07-31

## 2025-07-25 RX ORDER — ASPIRIN 325 MG
1 TABLET ORAL
Qty: 10 | Refills: 0
Start: 2025-07-25 | End: 2025-08-03

## 2025-07-25 RX ORDER — SENNA 187 MG
2 TABLET ORAL
Qty: 0 | Refills: 0 | DISCHARGE
Start: 2025-07-25

## 2025-07-25 RX ADMIN — CELECOXIB 200 MILLIGRAM(S): 50 CAPSULE ORAL at 06:08

## 2025-07-25 RX ADMIN — Medication 325 MILLIGRAM(S): at 12:03

## 2025-07-25 RX ADMIN — Medication 40 MILLIGRAM(S): at 06:08

## 2025-07-28 PROBLEM — M19.90 UNSPECIFIED OSTEOARTHRITIS, UNSPECIFIED SITE: Chronic | Status: ACTIVE | Noted: 2025-07-22

## 2025-07-28 PROBLEM — E78.5 HYPERLIPIDEMIA, UNSPECIFIED: Chronic | Status: ACTIVE | Noted: 2025-07-22

## 2025-08-07 ENCOUNTER — APPOINTMENT (OUTPATIENT)
Dept: ORTHOPEDIC SURGERY | Facility: CLINIC | Age: 65
End: 2025-08-07
Payer: MEDICARE

## 2025-08-07 DIAGNOSIS — M16.12 UNILATERAL PRIMARY OSTEOARTHRITIS, LEFT HIP: ICD-10-CM

## 2025-08-07 PROCEDURE — 73502 X-RAY EXAM HIP UNI 2-3 VIEWS: CPT

## 2025-08-07 PROCEDURE — 99024 POSTOP FOLLOW-UP VISIT: CPT

## 2025-08-27 ENCOUNTER — NON-APPOINTMENT (OUTPATIENT)
Age: 65
End: 2025-08-27

## (undated) DEVICE — POSITIONER PILLOW WHITE 18X24" DISP

## (undated) DEVICE — Device

## (undated) DEVICE — SUT VICRYL 2-0 27" CT-1 UNDYED

## (undated) DEVICE — PREP CHLORAPREP HI-LITE ORANGE 26ML

## (undated) DEVICE — MARKING PEN W RULER

## (undated) DEVICE — DRAPE U POLY BLUE 60X72"

## (undated) DEVICE — SUT ETHIBOND 1 30" OS6

## (undated) DEVICE — DRAPE LIGHT HANDLE COVER (GREEN)

## (undated) DEVICE — WARMING BLANKET UPPER ADULT

## (undated) DEVICE — SUT VICRYL 1 27" OS-8 UNDYED

## (undated) DEVICE — DRSG 2X2

## (undated) DEVICE — SUT HEWSON RETRIEVER

## (undated) DEVICE — DRSG STOCKINETTE IMPERVIOUS XL 12 X 48"

## (undated) DEVICE — HOOD FLYTE STRYKER HELMET SHIELD

## (undated) DEVICE — ELCTR STRYKER NEPTUNE SMOKE EVACUATION PENCIL (GREEN)

## (undated) DEVICE — SUT STRATAFIX SPIRAL MONOCRYL PLUS 3-0 30CM PS-1 UNDYED

## (undated) DEVICE — DRSG STOCKINETTE IMPERVIOUS XL

## (undated) DEVICE — VENODYNE/SCD SLEEVE CALF MEDIUM

## (undated) DEVICE — HOOD T7 PLUS PEELAWAY

## (undated) DEVICE — DRAPE U (BLUE) 60 X 72"

## (undated) DEVICE — DRAPE TOWEL BLUE 17" X 24"

## (undated) DEVICE — DRSG AQUACEL 3.5 X 10"

## (undated) DEVICE — DRSG COBAN 6"

## (undated) DEVICE — DRAPE LIGHT HANDLE COVER (BLUE)

## (undated) DEVICE — POSITIONER FOAM ABDUCTION PILLOW MED (PINK)

## (undated) DEVICE — SUT STRATAFIX SPIRAL PDS PLUS 1 30X30CM OS-6 VIOLET

## (undated) DEVICE — POSITIONER FOAM EGG CRATE ULNAR 2PCS (PINK)

## (undated) DEVICE — SUT VICRYL 3-0 18" PS-1 UNDYED

## (undated) DEVICE — SAW BLADE STRYKER SAGITTAL 25X86.5X1.32MM

## (undated) DEVICE — WOUND IRR IRRISEPT W 0.5 CHG

## (undated) DEVICE — GLV 8.5 PROTEXIS (WHITE)

## (undated) DEVICE — PACK TOTAL HIP

## (undated) DEVICE — SUT STRATAFIX SPIRAL PDO 1 30CM OS-6

## (undated) DEVICE — SUT ETHIBOND 1 30" OS8

## (undated) DEVICE — DRAPE MAYO STAND 23"

## (undated) DEVICE — SPECIMEN CONTAINER 4OZ

## (undated) DEVICE — DRSG STERISTRIPS 0.5 X 4"

## (undated) DEVICE — DRSG 4 X 8

## (undated) DEVICE — DRSG ACE BANDAGE 6"

## (undated) DEVICE — GOWN XXXL

## (undated) DEVICE — NDL HYPO REGULAR BEVEL 18GA X 1.5" (PINK)

## (undated) DEVICE — SYR LUER LOK 50CC